# Patient Record
Sex: FEMALE | Race: OTHER | HISPANIC OR LATINO
[De-identification: names, ages, dates, MRNs, and addresses within clinical notes are randomized per-mention and may not be internally consistent; named-entity substitution may affect disease eponyms.]

---

## 2024-01-02 PROBLEM — Z00.00 ENCOUNTER FOR PREVENTIVE HEALTH EXAMINATION: Status: ACTIVE | Noted: 2024-01-02

## 2024-01-10 ENCOUNTER — NON-APPOINTMENT (OUTPATIENT)
Age: 34
End: 2024-01-10

## 2024-01-12 ENCOUNTER — APPOINTMENT (OUTPATIENT)
Dept: ENDOCRINOLOGY | Facility: CLINIC | Age: 34
End: 2024-01-12
Payer: COMMERCIAL

## 2024-01-12 ENCOUNTER — NON-APPOINTMENT (OUTPATIENT)
Age: 34
End: 2024-01-12

## 2024-01-12 VITALS
WEIGHT: 147 LBS | SYSTOLIC BLOOD PRESSURE: 122 MMHG | HEIGHT: 64 IN | HEART RATE: 86 BPM | DIASTOLIC BLOOD PRESSURE: 78 MMHG | BODY MASS INDEX: 25.1 KG/M2

## 2024-01-12 DIAGNOSIS — Z82.0 FAMILY HISTORY OF EPILEPSY AND OTHER DISEASES OF THE NERVOUS SYSTEM: ICD-10-CM

## 2024-01-12 DIAGNOSIS — Z83.3 FAMILY HISTORY OF DIABETES MELLITUS: ICD-10-CM

## 2024-01-12 DIAGNOSIS — E66.3 OVERWEIGHT: ICD-10-CM

## 2024-01-12 PROCEDURE — 99204 OFFICE O/P NEW MOD 45 MIN: CPT | Mod: GC,25

## 2024-01-12 RX ORDER — ELECTROLYTES/DEXTROSE
SOLUTION, ORAL ORAL
Refills: 0 | Status: ACTIVE | COMMUNITY

## 2024-01-12 NOTE — REVIEW OF SYSTEMS
[Fatigue] : fatigue [Recent Weight Loss (___ Lbs)] : recent weight loss: [unfilled] lbs [Dry Skin] : dry skin [Hirsutism] : hirsutism [Cold Intolerance] : cold intolerance [Decreased Appetite] : appetite not decreased [Recent Weight Gain (___ Lbs)] : no recent weight gain [Fever] : no fever [Chills] : no chills [Chest Pain] : no chest pain [Shortness Of Breath] : no shortness of breath [Nausea] : no nausea [Constipation] : no constipation [Vomiting] : no vomiting [Diarrhea] : no diarrhea [Irregular Menses] : regular menses [Galactorrhea] : no galactorrhea

## 2024-01-12 NOTE — END OF VISIT
[] : Fellow [FreeTextEntry3] : Agree with Dr. Beronica Light assessment above. The patient has hirsutism along her chin line and mild areas of acne.  Patient will be worked up for hyperandrogenism.  Options for treatment including OCPs, spironolactone were discussed. She declines OCPs.  We also discussed symptomatic management for hirsutism, namely laser hair removal.  Encouraged healthy diet. Her periods are regular.  Screen for thyroid today. BMI is 25 in overweight category, we can screen for diabetes as well [Time Spent: ___ minutes] : I have spent [unfilled] minutes of time on the encounter. [>50% of the face to face encounter time was spent on counseling and/or coordination of care for ___] : Greater than 50% of the face to face encounter time was spent on counseling and/or coordination of care for [unfilled]

## 2024-01-12 NOTE — ASSESSMENT
[FreeTextEntry1] : Ms. Royal is a 33-year-old female with prediabetes, endometriosis (s/p ovarian cystectomy for 8 cm ovarian cyst during laparoscopic surgery in 11/2019), and asthma who presents to the clinic to establish care due to hirsutism.  # Hirsutism - She has regular menstrual cycles. However, she meets the diagnostic criteria for PCOS based on her history of ovarian cysts and hirsutism. We will perform tests to rule out other causes of hyperandrogenism.  - During our discussion, we talked about the available medical therapies for PCOS/hirsutism, which include combined oral contraceptives and spironolactone. While these treatments have been approved for managing her symptoms, laser hair removal would likely provide better results due to the localization of her excessive hair growth (i.e. chin). She expressed interest in laser hair removal instead of pharmacotherapy, at least for now.  - We will check her 17-hydroxyprogesterone, total/free testosterone, DHEA-S, TSH, and Free T4 to further evaluate her condition.  # Prediabetes / Overweight - Check hemoglobin A1C.  - Diet and lifestyle changes discussed, including limiting the amount of carbohydrates and refined sugar in the diet.   Return to clinic in 3 months. The case was discussed with Dr. Hanks.

## 2024-01-12 NOTE — PHYSICAL EXAM
[Alert] : alert [Well Nourished] : well nourished [No Acute Distress] : no acute distress [Normal Sclera/Conjunctiva] : normal sclera/conjunctiva [EOMI] : extra ocular movement intact [Visual Fields Grossly Intact] : visual fields grossly intact [No Proptosis] : no proptosis [Normal Oropharynx] : the oropharynx was normal [Thyroid Not Enlarged] : the thyroid was not enlarged [No Thyroid Nodules] : no palpable thyroid nodules [No Respiratory Distress] : no respiratory distress [No Accessory Muscle Use] : no accessory muscle use [Clear to Auscultation] : lungs were clear to auscultation bilaterally [Normal S1, S2] : normal S1 and S2 [Normal Rate] : heart rate was normal [Regular Rhythm] : with a regular rhythm [No Edema] : no peripheral edema [Normal Bowel Sounds] : normal bowel sounds [Not Tender] : non-tender [Not Distended] : not distended [Soft] : abdomen soft [No Stigmata of Cushings Syndrome] : no stigmata of Cushings Syndrome [Oriented x3] : oriented to person, place, and time [de-identified] : (+) Facial hair growth around chin area.

## 2024-01-12 NOTE — HISTORY OF PRESENT ILLNESS
[FreeTextEntry1] : Ms. Royal is a 33-year-old female with prediabetes, endometriosis (s/p ovarian cystectomy for 8 cm ovarian cyst during laparoscopic surgery in 11/2019), and asthma who presents to the clinic to establish care due to hirsutism.  She has been experiencing excessive facial hair growth and has had to shave at least once a week for the past few years, although she cannot recall exactly for how long. She reports experiencing cold intolerance and mood swings, which she claims have been present as far back as she can remember. She has also noticed that she has been losing more hair than normal whenever she washes it in the shower. She looked online and thought her symptoms could be related to a hormonal imbalance.   She denies having any family history of endocrine issues apart from diabetes in her mother. Her menstrual cycles are regular, occurring every 28 days and lasting for approximately five days, with the first three days being heavy bleeding and the last two days being light. She denies having galactorrhea or vision issues. She has a 15-year-old son who lives with her.

## 2024-01-17 LAB
DHEA-S SERPL-MCNC: 238 UG/DL
ESTIMATED AVERAGE GLUCOSE: 120 MG/DL
HBA1C MFR BLD HPLC: 5.8 %
T4 FREE SERPL-MCNC: 1 NG/DL
TESTOST FREE SERPL-MCNC: 0.3 PG/ML
TESTOST SERPL-MCNC: 10.6 NG/DL
TSH SERPL-ACNC: 1.53 UIU/ML

## 2024-01-18 LAB — 17OHP SERPL-MCNC: 195 NG/DL

## 2024-06-21 ENCOUNTER — APPOINTMENT (OUTPATIENT)
Dept: ENDOCRINOLOGY | Facility: CLINIC | Age: 34
End: 2024-06-21
Payer: COMMERCIAL

## 2024-06-21 ENCOUNTER — TRANSCRIPTION ENCOUNTER (OUTPATIENT)
Age: 34
End: 2024-06-21

## 2024-06-21 DIAGNOSIS — E28.2 POLYCYSTIC OVARIAN SYNDROME: ICD-10-CM

## 2024-06-21 DIAGNOSIS — R73.03 PREDIABETES.: ICD-10-CM

## 2024-06-21 DIAGNOSIS — F41.9 ANXIETY DISORDER, UNSPECIFIED: ICD-10-CM

## 2024-06-21 DIAGNOSIS — R45.89 OTHER SYMPTOMS AND SIGNS INVOLVING EMOTIONAL STATE: ICD-10-CM

## 2024-06-21 DIAGNOSIS — L68.0 HIRSUTISM: ICD-10-CM

## 2024-06-21 PROCEDURE — 99214 OFFICE O/P EST MOD 30 MIN: CPT | Mod: GC

## 2024-06-21 NOTE — REVIEW OF SYSTEMS
[Fatigue] : fatigue [Decreased Appetite] : decreased appetite [Recent Weight Loss (___ Lbs)] : recent weight loss: [unfilled] lbs [Depression] : depression [Insomnia] : insomnia [Anxiety] : anxiety [Stress] : stress [Fever] : no fever [Chills] : no chills [Chest Pain] : no chest pain [Palpitations] : no palpitations [Shortness Of Breath] : no shortness of breath [Nausea] : no nausea [Constipation] : no constipation [Vomiting] : no vomiting [Diarrhea] : no diarrhea [Irregular Menses] : regular menses

## 2024-06-21 NOTE — HISTORY OF PRESENT ILLNESS
[FreeTextEntry1] : Ms. Royal is a 34-year-old female with prediabetes, endometriosis (s/p ovarian cystectomy for 8 cm ovarian cyst during laparoscopic surgery in 11/2019), and asthma who presents to the clinic for follow-up.   HISTORY OF HIRSUTISM  She reported experiencing excessive facial hair growth and had to shave at least once a week for the past few years She denied having any family history of endocrine issues apart from diabetes in her mother. Her menstrual cycles are regular, occurring every 28 days and lasting for approximately five days, with the first three days being heavy bleeding and the last two days being light. She denies having galactorrhea or vision issues. She has a 15-year-old son who lives with her.   Today, she complaints of experiencing persistent fatigue, insomnia, weight loss (doesn't know how much weight she has lost) and mood swings (described as feeling depressed occasionally, and then sometimes gets angry easily, happen throughout the month, not specifically related to menstrual cycles). She states that even though her labs were normal during her last visit, she still feels that she might have some hormonal imbalance.

## 2024-06-21 NOTE — ASSESSMENT
[FreeTextEntry1] : Ms. Royal is a 34-year-old female with prediabetes, endometriosis (s/p ovarian cystectomy for 8 cm ovarian cyst during laparoscopic surgery in 11/2019), and asthma who presents to the clinic to establish care due to hirsutism.  # Depressed mood / anxiety - The patient started crying when discussing family issues and says that she has been under a lot of stress lately. She can't sleep because she feels anxious throughout the night and "can't stop thinking about problems". Discussed that these symptoms are likely related to anxiety/depression as her labs were unremarkable during her last visit.  - Repeat TFT today.   # Hirsutism - She has regular menstrual cycles. However, she meets the diagnostic criteria for PCOS based on her history of ovarian cysts and hirsutism. We will perform tests to rule out other causes of hyperandrogenism.  - 17-hydroxyprogesterone, total/free testosterone, DHEA-S, TSH, and Free T4 were within normal limits.  - Discussed treatment options. She will be scheduling lase hair removal therapy.   # Prediabetes / Overweight - A1C 5.8%.  - Diet and lifestyle changes discussed, including limiting the amount of carbohydrates and refined sugar in the diet.   Return to clinic as needed.  The case was discussed with Dr. Hanks.

## 2024-06-21 NOTE — END OF VISIT
[] : Fellow [FreeTextEntry3] : Agree with Dr. Beronica Light assessment and plan above. The patient has several symptoms and reports significant social/familial stressors.  There is emotional lability at baseline but it may be worse around her menses. Periods are regular.  The patient request re-evaluation of thyroid levels.  She was chemically euthyroid in January 2024. We can repeat levels now.  During the visit, she was crying and emotional support was offered.  The patient encouraged to find mental health care provider to help with stress and coping.   [Time Spent: ___ minutes] : I have spent [unfilled] minutes of time on the encounter.

## 2024-06-21 NOTE — PHYSICAL EXAM
[Alert] : alert [Well Nourished] : well nourished [No Acute Distress] : no acute distress [Normal Sclera/Conjunctiva] : normal sclera/conjunctiva [EOMI] : extra ocular movement intact [Visual Fields Grossly Intact] : visual fields grossly intact [No Proptosis] : no proptosis [Normal Oropharynx] : the oropharynx was normal [Thyroid Not Enlarged] : the thyroid was not enlarged [No Thyroid Nodules] : no palpable thyroid nodules [No Respiratory Distress] : no respiratory distress [No Accessory Muscle Use] : no accessory muscle use [Clear to Auscultation] : lungs were clear to auscultation bilaterally [Normal S1, S2] : normal S1 and S2 [Normal Rate] : heart rate was normal [Regular Rhythm] : with a regular rhythm [No Edema] : no peripheral edema [Normal Bowel Sounds] : normal bowel sounds [Not Tender] : non-tender [Not Distended] : not distended [Soft] : abdomen soft [No Stigmata of Cushings Syndrome] : no stigmata of Cushings Syndrome [Oriented x3] : oriented to person, place, and time

## 2024-06-26 ENCOUNTER — APPOINTMENT (OUTPATIENT)
Dept: ENDOCRINOLOGY | Facility: CLINIC | Age: 34
End: 2024-06-26